# Patient Record
(demographics unavailable — no encounter records)

---

## 2024-10-09 NOTE — PLAN
[FreeTextEntry1] : Consider excision of endometrioma. Address breast issue first. NL exam. total time > 22 minutes

## 2024-10-09 NOTE — PHYSICAL EXAM
[JVD] : no jugular venous distention  [Normal Breath Sounds] : Normal breath sounds [Normal Heart Sounds] : normal heart sounds [No Rash or Lesion] : No rash or lesion [Alert] : alert [Oriented to Person] : oriented to person [Oriented to Place] : oriented to place [Oriented to Time] : oriented to time [Calm] : calm [de-identified] : NAD [de-identified] : NC/AT PER [de-identified] : soft, no masses, no nipple d/c, no LN [de-identified] : soft [de-identified] : moves all 4 extr 5/5

## 2025-08-16 NOTE — ASSESSMENT
[FreeTextEntry1] : History for atypical ductal hyperplasia breast  Patient advised bilateral MRI guided breast biopsy  She voices understanding  She will schedule this at her convenience  A total of 30 minutes was spent in consultation

## 2025-08-16 NOTE — HISTORY OF PRESENT ILLNESS
[FreeTextEntry1] : Patient has ADH  Denies palpable mass, breast pain, redness on the skin, nipple discharge. Patient in office to discuss surgical plan  Patient had a breast MRI 5/27/25 recommending bilateral breast biopsies